# Patient Record
(demographics unavailable — no encounter records)

---

## 2025-02-15 NOTE — HEALTH RISK ASSESSMENT
[0] : 2) Feeling down, depressed, or hopeless: Not at all (0) [PHQ-2 Negative - No further assessment needed] : PHQ-2 Negative - No further assessment needed [Never] : Never [[id number]] : 0 [de-identified] : vaping-

## 2025-02-15 NOTE — PHYSICAL EXAM
[Soft] : abdomen soft [Non-distended] : non-distended [Normal Bowel Sounds] : normal bowel sounds [Speech Grossly Normal] : speech grossly normal [Normal Affect] : the affect was normal [Normal Mood] : the mood was normal [de-identified] : +minimally tender periumbilical area to deep palpation

## 2025-02-15 NOTE — PHYSICAL EXAM
[Soft] : abdomen soft [Non-distended] : non-distended [Normal Bowel Sounds] : normal bowel sounds [Speech Grossly Normal] : speech grossly normal [Normal Affect] : the affect was normal [Normal Mood] : the mood was normal [de-identified] : +minimally tender periumbilical area to deep palpation

## 2025-02-15 NOTE — HISTORY OF PRESENT ILLNESS
[Other: _____] : [unfilled] [FreeTextEntry8] : TAMIKA MEDRANO is a 21 year M who presents today with GI complaints. He reports an episode of periumbilical pain that awoke him in the middle of the night last Monday. It was followed by multiple episodes of bilious vomiting. Ever since a persistent sensation of  + early  satiety. Unable to consume a full meal since. Now having one BM daily.  Has no prior hx of abdominal surgery.  No ill contacts.

## 2025-02-15 NOTE — HEALTH RISK ASSESSMENT
[0] : 2) Feeling down, depressed, or hopeless: Not at all (0) [PHQ-2 Negative - No further assessment needed] : PHQ-2 Negative - No further assessment needed [Never] : Never [[id number]] : 0 [de-identified] : vaping-